# Patient Record
Sex: MALE | Race: BLACK OR AFRICAN AMERICAN | URBAN - METROPOLITAN AREA
[De-identification: names, ages, dates, MRNs, and addresses within clinical notes are randomized per-mention and may not be internally consistent; named-entity substitution may affect disease eponyms.]

---

## 2017-10-31 ENCOUNTER — EMERGENCY (EMERGENCY)
Facility: HOSPITAL | Age: 27
LOS: 1 days | Discharge: HOME | End: 2017-10-31
Attending: EMERGENCY MEDICINE | Admitting: EMERGENCY MEDICINE
Payer: OTHER GOVERNMENT

## 2017-10-31 VITALS
OXYGEN SATURATION: 99 % | SYSTOLIC BLOOD PRESSURE: 143 MMHG | DIASTOLIC BLOOD PRESSURE: 77 MMHG | HEART RATE: 106 BPM | TEMPERATURE: 99 F | RESPIRATION RATE: 18 BRPM

## 2017-10-31 PROCEDURE — 12032 INTMD RPR S/A/T/EXT 2.6-7.5: CPT

## 2017-10-31 PROCEDURE — 99053 MED SERV 10PM-8AM 24 HR FAC: CPT

## 2017-10-31 PROCEDURE — 99284 EMERGENCY DEPT VISIT MOD MDM: CPT | Mod: 25
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
## 2017-11-01 VITALS
TEMPERATURE: 99 F | HEART RATE: 79 BPM | OXYGEN SATURATION: 99 % | SYSTOLIC BLOOD PRESSURE: 134 MMHG | DIASTOLIC BLOOD PRESSURE: 69 MMHG | RESPIRATION RATE: 18 BRPM

## 2017-11-01 RX ORDER — TETANUS TOXOID, REDUCED DIPHTHERIA TOXOID AND ACELLULAR PERTUSSIS VACCINE, ADSORBED 5; 2.5; 8; 8; 2.5 [IU]/.5ML; [IU]/.5ML; UG/.5ML; UG/.5ML; UG/.5ML
0.5 SUSPENSION INTRAMUSCULAR ONCE
Qty: 0 | Refills: 0 | Status: COMPLETED | OUTPATIENT
Start: 2017-11-01 | End: 2017-11-01

## 2017-11-01 RX ORDER — OXYCODONE HYDROCHLORIDE 5 MG/1
1 TABLET ORAL
Qty: 12 | Refills: 0 | OUTPATIENT
Start: 2017-11-01 | End: 2017-11-04

## 2017-11-01 RX ORDER — OXYCODONE AND ACETAMINOPHEN 5; 325 MG/1; MG/1
1 TABLET ORAL ONCE
Qty: 0 | Refills: 0 | Status: DISCONTINUED | OUTPATIENT
Start: 2017-11-01 | End: 2017-11-01

## 2017-11-01 RX ADMIN — Medication 1 TABLET(S): at 00:39

## 2017-11-01 RX ADMIN — TETANUS TOXOID, REDUCED DIPHTHERIA TOXOID AND ACELLULAR PERTUSSIS VACCINE, ADSORBED 0.5 MILLILITER(S): 5; 2.5; 8; 8; 2.5 SUSPENSION INTRAMUSCULAR at 00:39

## 2017-11-01 RX ADMIN — OXYCODONE AND ACETAMINOPHEN 1 TABLET(S): 5; 325 TABLET ORAL at 00:41

## 2017-11-01 NOTE — ED PROVIDER NOTE - MUSCULOSKELETAL, MLM
LUE - deep laceration approximately 6.5 cm over the dorsal aspect of the wrist.  Bleeding (dark).  NO visible bone/tendons.

## 2017-11-01 NOTE — ED PROVIDER NOTE - NEUROLOGICAL, MLM
Hand exam:  normal thumb opposition and extension.  limited abduction against force.  Normal extension  at the MCP joints.  Distal sensation grossly intact.  Bounding radial and ulnar pulses.  Cap refill < 2 seconds.  Sensation intact over the first webspace.
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
## 2017-11-01 NOTE — ED PROCEDURE NOTE - CPROC ED POST PROC CARE GUIDE1
Verbal/written post procedure instructions were given to patient/caregiver./Elevate the injured extremity as instructed./Instructed patient/caregiver regarding signs and symptoms of infection./Instructed patient/caregiver to follow-up with primary care physician.
Verbal/written post procedure instructions were given to patient/caregiver./Instructed patient/caregiver to follow-up with primary care physician./Instructed patient/caregiver regarding signs and symptoms of infection./Keep the cast/splint/dressing clean and dry.

## 2017-11-01 NOTE — ED PROVIDER NOTE - PROGRESS NOTE DETAILS
splinted.  Bleeding controlled.  Spoke with on call hand surgeon Dr. Freeman who will see the patient in the office tomorrow.  Limited abduction of the digits, likely due to pain/edema.  Perfusion of the digits intact.  Repeat hand exam unchanged.  Cover with pain medication, abx, splint and sling.  strict ED return instructions given. splinted.  Bleeding controlled.  Spoke with on call hand surgeon Dr. Freeman who will see the patient in the office tomorrow.  Limited abduction of the digits, likely due to pain/edema.  Perfusion of the digits intact.  Repeat hand exam unchanged.  EPL and EDC intact.  Cover with pain medication, abx, splint and sling.  strict ED return instructions given. splinted after suturing.  Bleeding controlled.  Subjective complains and exam findings no consistent with area of injury.  Discussed with on call hand surgeon Dr. Freeman who will see the patient in the office tomorrow at noon.  Limited abduction of the digits, likely due to pain/edema.  Perfusion intact with cap refill < 2 seconds..  Repeat hand exam unchanged from prior.  EPL and EDC intact.  Cover with abx, splint, pain medication and sling.  Very strict ED return instructions given.

## 2017-11-01 NOTE — ED PROVIDER NOTE - CARE PLAN
Principal Discharge DX:	Wrist injuries, left, initial encounter  Secondary Diagnosis:	Laceration of left wrist, initial encounter
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
## 2017-11-09 DIAGNOSIS — M25.532 PAIN IN LEFT WRIST: ICD-10-CM

## 2017-11-09 DIAGNOSIS — S61.512A LACERATION WITHOUT FOREIGN BODY OF LEFT WRIST, INITIAL ENCOUNTER: ICD-10-CM

## 2017-11-09 DIAGNOSIS — Z23 ENCOUNTER FOR IMMUNIZATION: ICD-10-CM
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
## 2022-09-24 NOTE — ED PROCEDURE NOTE - CPROC ED INFORMED CONSENT1
Benefits, risks, and possible complications of procedure explained to patient/caregiver who verbalized understanding and gave verbal consent.
Benefits, risks, and possible complications of procedure explained to patient/caregiver who verbalized understanding and gave verbal consent.
Name band;